# Patient Record
Sex: FEMALE | Race: WHITE
[De-identification: names, ages, dates, MRNs, and addresses within clinical notes are randomized per-mention and may not be internally consistent; named-entity substitution may affect disease eponyms.]

---

## 2017-07-06 ENCOUNTER — HOSPITAL ENCOUNTER (OUTPATIENT)
Dept: HOSPITAL 39 - LAB.O | Age: 52
Discharge: HOME | End: 2017-07-06
Payer: COMMERCIAL

## 2017-07-06 DIAGNOSIS — Z79.891: Primary | ICD-10-CM

## 2018-01-09 ENCOUNTER — HOSPITAL ENCOUNTER (OUTPATIENT)
Dept: HOSPITAL 39 - US | Age: 53
Discharge: HOME | End: 2018-01-09
Attending: UROLOGY
Payer: COMMERCIAL

## 2018-01-09 DIAGNOSIS — N13.30: Primary | ICD-10-CM

## 2018-01-09 NOTE — US
EXAM DESCRIPTION: 

Renal: Ultrasound.



CLINICAL HISTORY: 

HYDRONEPHROSIS. "Surgery on the right kidney in the past."



COMPARISON: 

None.



TECHNIQUE: 

Transcutaneous scanning: Two-dimensional and Doppler modes.

Technically difficult study due to patient body habitus.



FINDINGS: 

Right kidney measures 12.7 x 6.0 x 6.0 cm; mid-renal cortical

thickness normal . Echogenicity minimally increased in the

cortex. No hydronephrosis No calcifications. Smooth contour of

the kidney with no perinephric fluid. Normal vascularity. 

Proximal ureter not visualized.



Left kidney measures 12.1 x 6.8 x 6.6 cm; mid-renal cortical

thickness normal echogenicity minimally decreased in the cortex.

No hydronephrosis. No calcifications. Smooth contour of the

kidney with no perinephric fluid. Normal vascularity..  Proximal

ureter not visualized.



Urinary bladder not visualized.  Abdominal aorta not visualized.



IMPRESSION: 

1. Bilateral kidneys with increased cortical echogenicity but no

cortical thinning. No hydronephrosis or perinephric fluid. No

ascites. Urinary bladder and abdominal aorta not visualized.



Electronically signed by:  Jeff Hutton MD  1/9/2018 1:36 PM [facility]

Workstation: [id number]